# Patient Record
Sex: MALE | Race: WHITE | NOT HISPANIC OR LATINO | Employment: FULL TIME | URBAN - METROPOLITAN AREA
[De-identification: names, ages, dates, MRNs, and addresses within clinical notes are randomized per-mention and may not be internally consistent; named-entity substitution may affect disease eponyms.]

---

## 2023-08-24 PROBLEM — Q61.2 ADPKD (AUTOSOMAL DOMINANT POLYCYSTIC KIDNEY): Status: ACTIVE | Noted: 2023-08-24

## 2023-08-24 PROBLEM — R62.50 MILD DEVELOPMENTAL DELAY: Status: ACTIVE | Noted: 2023-08-24

## 2023-09-07 ENCOUNTER — OFFICE VISIT (OUTPATIENT)
Dept: PEDIATRICS | Facility: CLINIC | Age: 18
End: 2023-09-07
Payer: COMMERCIAL

## 2023-09-07 VITALS
DIASTOLIC BLOOD PRESSURE: 68 MMHG | HEART RATE: 100 BPM | WEIGHT: 157.5 LBS | SYSTOLIC BLOOD PRESSURE: 120 MMHG | BODY MASS INDEX: 21.33 KG/M2 | HEIGHT: 72 IN

## 2023-09-07 DIAGNOSIS — Z00.00 ANNUAL PHYSICAL EXAM: Primary | ICD-10-CM

## 2023-09-07 PROCEDURE — 1036F TOBACCO NON-USER: CPT | Performed by: PEDIATRICS

## 2023-09-07 PROCEDURE — 99395 PREV VISIT EST AGE 18-39: CPT | Performed by: PEDIATRICS

## 2023-09-07 PROCEDURE — 3008F BODY MASS INDEX DOCD: CPT | Performed by: PEDIATRICS

## 2023-09-07 NOTE — PATIENT INSTRUCTIONS
1. Anticipatory guidance discussed.  Gave handout on well-child issues at this age.  2.  Weight management:  The patient was counseled regarding physical activity.  3. Development: appropriate for age  4. No orders of the defined types were placed in this encounter.    5. Follow-up visit in 1 year for next well child visit, or sooner as needed.  Jn was seen today for well child.  Diagnoses and all orders for this visit:  Annual physical exam (Primary)  BMI 21.0-21.9, adult  Other orders  -     1 Year Follow Up In Pediatrics; Future

## 2023-09-07 NOTE — PROGRESS NOTES
Here per self  General Health:  Overall healthy: yes  Concerns today:  Social and Family History:  Any changes?  Nutrition:  Well balanced diet and adequate calcium for age: yes  Dental Care:  Regular dental visits: yes  Brushes twice daily: yes  Elimination:  Elimination patterns appropriate: yes  Sleep:  Adequate sleep: yes  Sleep problems: no      Education/work:  Graduated high school:  College:  Working: [email or web address]  Activities:  Physical Activity: ONLY AT WORK  Social activities:        RISK factors:  Dating? no  Sexual activity?          If yes, form of birth control:  Alcohol?  no  Marijuana? no  Drugs?  no  Smoking? no  Vaping? no    Safety Assessment:  Safety topics were reviewed  Seat belt: yes     Driving without distractions: yes  Exposure to pets:   Smoke detectors: yes   Sun safety/ Sunscreen: yes   Water safety: yes  Firearms in house:     Internet and texting safety: yes     Review of Systems:  Constitutional: Otherwise denies fever, chills, or changes in behavior. No difficulties with sleeping, eating, drinking, urine output, or bowel movements.    Eyes, ENT: Denies eye complaints, ear complaints, nasal congestion, runny nose, or sore throat.   Cardio/Resp: Denies chest pain, palpitations, shortness of breath, wheezing, stridor at rest, cough, working hard to breathe, or breathing fast.   GI/Renal: Denies nausea, vomiting, stomachache, diarrhea, or constipation. Denies dysuria or abnormal urine color or smell.   Musculoskeletal/Skin: Denies muscle or joint complaints. Denies skin rash.   Neuro/Psych: Denies headache, dizziness, or confusion.  Endo/heme/lymph: Denies excessive thirst, excessive sweating, bruising, bleeding, or swollen glands.     Physical Exam  Vitals reviewed.   Constitutional:          Appearance: Normal appearance  HENT:      Head: Normocephalic.      Right Ear: External ear normal and without deformities. Normal TM.      Left Ear: External ear normal and without deformities. Normal  TM.      Nose: Nose normal, patent nares and without deformities.      Mouth/Throat: Normal palate     Mouth: Mucous membranes are moist.      Pharynx: Oropharynx is clear.   Neck:     General: Normal. No lymphadenopathy.     Eyes:      Extraocular Movements: Extraocular movements intact.      Conjunctiva/sclera: Conjunctivae normal.      Pupils: Pupils are equal, round, and reactive to light.   Cardiovascular:      Rate and Rhythm: Normal rate and regular rhythm.      Pulses: Normal pulses.      Heart sounds: Normal heart sounds.   Pulmonary:      Effort: Pulmonary effort is normal.      Breath sounds: Normal breath sounds.   Abdominal:      General: Abdomen is flat.      Palpations: Abdomen is soft.   Genitourinary:     Normal genitalia  Musculoskeletal:         General: Normal range of motion, strength and tone.     Cervical back: Normal range of motion and neck supple.   Skin:     General: Skin is warm and dry.      No rash or lesions        Neurological:      General: No focal deficit present.      Mental Status:      Assessment/Plan:       1. Anticipatory guidance discussed.  Gave handout on well-child issues at this age.  2.  Weight management:  The patient was counseled regarding physical activity.  3. Development: appropriate for age  4. No orders of the defined types were placed in this encounter.    5. Follow-up visit in 1 year for next well child visit, or sooner as needed.  Jn was seen today for well child.  Diagnoses and all orders for this visit:  Annual physical exam (Primary)  BMI 21.0-21.9, adult  Other orders  -     1 Year Follow Up In Pediatrics; Future

## 2024-09-09 ENCOUNTER — APPOINTMENT (OUTPATIENT)
Dept: PEDIATRICS | Facility: CLINIC | Age: 19
End: 2024-09-09
Payer: COMMERCIAL

## 2024-09-09 VITALS
WEIGHT: 183.2 LBS | BODY MASS INDEX: 24.28 KG/M2 | HEART RATE: 80 BPM | SYSTOLIC BLOOD PRESSURE: 104 MMHG | HEIGHT: 73 IN | DIASTOLIC BLOOD PRESSURE: 60 MMHG

## 2024-09-09 DIAGNOSIS — Z88.1 ALLERGY TO MULTIPLE ANTIBIOTICS: Primary | ICD-10-CM

## 2024-09-09 DIAGNOSIS — Z00.00 WELL ADULT HEALTH CHECK: ICD-10-CM

## 2024-09-09 PROCEDURE — 99395 PREV VISIT EST AGE 18-39: CPT | Performed by: PEDIATRICS

## 2024-09-09 PROCEDURE — 96127 BRIEF EMOTIONAL/BEHAV ASSMT: CPT | Performed by: PEDIATRICS

## 2024-09-09 PROCEDURE — 3008F BODY MASS INDEX DOCD: CPT | Performed by: PEDIATRICS

## 2024-09-09 ASSESSMENT — PATIENT HEALTH QUESTIONNAIRE - PHQ9
3. TROUBLE FALLING OR STAYING ASLEEP OR SLEEPING TOO MUCH: SEVERAL DAYS
10. IF YOU CHECKED OFF ANY PROBLEMS, HOW DIFFICULT HAVE THESE PROBLEMS MADE IT FOR YOU TO DO YOUR WORK, TAKE CARE OF THINGS AT HOME, OR GET ALONG WITH OTHER PEOPLE: SOMEWHAT DIFFICULT
2. FEELING DOWN, DEPRESSED OR HOPELESS: NOT AT ALL
9. THOUGHTS THAT YOU WOULD BE BETTER OFF DEAD, OR OF HURTING YOURSELF: NOT AT ALL
8. MOVING OR SPEAKING SO SLOWLY THAT OTHER PEOPLE COULD HAVE NOTICED. OR THE OPPOSITE, BEING SO FIGETY OR RESTLESS THAT YOU HAVE BEEN MOVING AROUND A LOT MORE THAN USUAL: NOT AT ALL
5. POOR APPETITE OR OVEREATING: NOT AT ALL
8. MOVING OR SPEAKING SO SLOWLY THAT OTHER PEOPLE COULD HAVE NOTICED. OR THE OPPOSITE - BEING SO FIDGETY OR RESTLESS THAT YOU HAVE BEEN MOVING AROUND A LOT MORE THAN USUAL: NOT AT ALL
3. TROUBLE FALLING OR STAYING ASLEEP: SEVERAL DAYS
SUM OF ALL RESPONSES TO PHQ QUESTIONS 1-9: 2
6. FEELING BAD ABOUT YOURSELF - OR THAT YOU ARE A FAILURE OR HAVE LET YOURSELF OR YOUR FAMILY DOWN: NOT AT ALL
10. IF YOU CHECKED OFF ANY PROBLEMS, HOW DIFFICULT HAVE THESE PROBLEMS MADE IT FOR YOU TO DO YOUR WORK, TAKE CARE OF THINGS AT HOME, OR GET ALONG WITH OTHER PEOPLE: SOMEWHAT DIFFICULT
SUM OF ALL RESPONSES TO PHQ9 QUESTIONS 1 & 2: 0
1. LITTLE INTEREST OR PLEASURE IN DOING THINGS: NOT AT ALL
7. TROUBLE CONCENTRATING ON THINGS, SUCH AS READING THE NEWSPAPER OR WATCHING TELEVISION: NOT AT ALL
7. TROUBLE CONCENTRATING ON THINGS, SUCH AS READING THE NEWSPAPER OR WATCHING TELEVISION: NOT AT ALL
5. POOR APPETITE OR OVEREATING: NOT AT ALL
2. FEELING DOWN, DEPRESSED OR HOPELESS: NOT AT ALL
9. THOUGHTS THAT YOU WOULD BE BETTER OFF DEAD, OR OF HURTING YOURSELF: NOT AT ALL
4. FEELING TIRED OR HAVING LITTLE ENERGY: SEVERAL DAYS
1. LITTLE INTEREST OR PLEASURE IN DOING THINGS: NOT AT ALL
6. FEELING BAD ABOUT YOURSELF - OR THAT YOU ARE A FAILURE OR HAVE LET YOURSELF OR YOUR FAMILY DOWN: NOT AT ALL
4. FEELING TIRED OR HAVING LITTLE ENERGY: SEVERAL DAYS

## 2024-09-09 NOTE — PATIENT INSTRUCTIONS
Assessment/Plan:  Jn was seen today for well child.  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  You could also use some G0 Gatorade.  Please watch your serving sizes also.

## 2024-09-09 NOTE — PROGRESS NOTES
Here per self  General Health:  Overall healthy: yes  Concerns today:  Social and Family History:  Any changes?  Nutrition:  Well balanced diet and adequate calcium for age: yes  Dental Care:  Regular dental visits: yes  Brushes twice daily: yes  Elimination:  Elimination patterns appropriate: yes  Sleep:  Adequate sleep: yes  Sleep problems: no      Education/work:  Graduated high school:  College:  Working: [employer]  Activities:  Physical Activity:  Social activities:        RISK factors:  Dating?  no  Sexual activity?          If yes, form of birth control:  Alcohol?  no  Marijuana? no  Drugs?  no  Smoking? no  Vaping? no    Mental Health  Registration And Check In Additional Questions    9/9/2024  7:48 AM EDT - Filed by Patient   In which country were you born? [place]     Patient Health Questionnaire-Depression Screening (Phq-9)    9/9/2024  7:51 AM EDT - Filed by Patient   Over the last 2 weeks, how often have you been bothered by any of the following problems?    Little interest or pleasure in doing things Not at all   Feeling down, depressed, or hopeless Not at all   Trouble falling or staying asleep, or sleeping too much Several days   Feeling tired or having little energy Several days   Poor appetite or overeating Not at all   Feeling bad about yourself - or that you are a failure or have let yourself or your family down Not at all   Trouble concentrating on things, such as reading the newspaper or watching television Not at all   Moving or speaking so slowly that other people could have noticed? Or the opposite - being so fidgety or restless that you have been moving around a lot more than usual. Not at all   Thoughts that you would be better off dead or hurting yourself in some way Not at all   If you checked off any problems on this questionnaire, how difficult have these problems made it for you to do your work, take care of things at home, or get along with other  people? Somewhat difficult      Asq-Ask Suicide-Screening Questions    9/9/2024  7:51 AM EDT - Filed by Patient   In the past few weeks, have you wished you were dead? No   In the past few weeks, have you felt that you or your family would be better off if you were dead? No   In the past week, have you been having thoughts about killing yourself? No   Have you ever tried to kill yourself? No     PHQ9-0  ASQ-0    Safety Assessment:  Safety topics were reviewed  Seat belt: yes     Driving without distractions: yes  Exposure to pets:   Smoke detectors: yes   Sun safety/ Sunscreen: yes   Water safety: yes  Firearms in house:     Internet and texting safety: yes     Review of Systems:  Constitutional: Otherwise denies fever, chills, or changes in behavior. No difficulties with sleeping, eating, drinking, urine output, or bowel movements.    Eyes, ENT: Denies eye complaints, ear complaints, nasal congestion, runny nose, or sore throat.   Cardio/Resp: Denies chest pain, palpitations, shortness of breath, wheezing, stridor at rest, cough, working hard to breathe, or breathing fast.   GI/Renal: Denies nausea, vomiting, stomachache, diarrhea, or constipation. Denies dysuria or abnormal urine color or smell.   Musculoskeletal/Skin: Denies muscle or joint complaints. Denies skin rash.   Neuro/Psych: Denies headache, dizziness, or confusion.  Endo/heme/lymph: Denies excessive thirst, excessive sweating, bruising, bleeding, or swollen glands.     Physical Exam  Vitals reviewed.   Constitutional:          Appearance: Normal appearance  HENT:      Head: Normocephalic.      Right Ear: External ear normal and without deformities. Normal TM.      Left Ear: External ear normal and without deformities. Normal TM.      Nose: Nose normal, patent nares and without deformities.      Mouth/Throat: Normal palate     Mouth: Mucous membranes are moist.      Pharynx: Oropharynx is clear.   Neck:     General: Normal. No lymphadenopathy.      Eyes:      Extraocular Movements: Extraocular movements intact.      Conjunctiva/sclera: Conjunctivae normal.      Pupils: Pupils are equal, round, and reactive to light.   Cardiovascular:      Rate and Rhythm: Normal rate and regular rhythm.      Pulses: Normal pulses.      Heart sounds: Normal heart sounds.   Pulmonary:      Effort: Pulmonary effort is normal.      Breath sounds: Normal breath sounds.   Abdominal:      General: Abdomen is flat.      Palpations: Abdomen is soft.   Genitourinary:     Normal genitalia  Musculoskeletal:         General: Normal range of motion, strength and tone.     Cervical back: Normal range of motion and neck supple.   Skin:     General: Skin is warm and dry.      No rash or lesions        Neurological:      General: No focal deficit present.      Mental Status:      Assessment/Plan:  Jn was seen today for well child.  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  You could also use some G0 Gatorade.  Please watch your serving sizes also.